# Patient Record
(demographics unavailable — no encounter records)

---

## 2024-11-27 NOTE — IMAGING
[de-identified] : Left ankle/foot:   Inspection: Swelling noted  Palpation: TTP lateral and medial malleolus  Knee and Ankle Range of Motion: Full with pain Strength: 5/5 Neurovascular intact Gait: antalgic  Right Ankle/Foot  Inspection: No swelling Palpation: Non tender to palpation Knee and Ankle Range of Motion: Full without pain Strength: 5/5  Neurovascular intact Gait: antalgic

## 2024-11-27 NOTE — HISTORY OF PRESENT ILLNESS
[de-identified] : 11/27/24: pt is a 63 y/o male with mey foot pain. pt states that he fell down his ladder and his feet got tangled up in the rungs. Pt also notes pain underneath his left shoulder blade and tailbone. Injury occurred this morning. pt took tylenol prior to coming and has been applying ice. pt notes hx of tendonitis in his rt foot. pt also notes hx of left ankle fx. PMH: None [de-identified] : none

## 2024-11-27 NOTE — ASSESSMENT
[FreeTextEntry1] : 63 y/o F with L ankle sprain, XR negative for fx b/l  CAM boot, WBAT  ice/rest/elevation meloxicam prescribed for pain/swelling Follow up with foot and ankle specialist 1-2 weeks

## 2024-12-10 NOTE — DISCUSSION/SUMMARY
[de-identified] : New dressing applied with bacitracin. May d/c cam boot icing/elevation home exercises and stretching Can continue Meloxicam 15mg as needed f/u 6 weeks or as needed

## 2024-12-10 NOTE — IMAGING
[Left] : left foot [Weight -] : weightbearing [There are no fractures, subluxations or dislocations. No significant abnormalities are seen] : There are no fractures, subluxations or dislocations. No significant abnormalities are seen [FreeTextEntry9] : images reviewed from 11/27/24 [de-identified] : images reviewed from 11/27/24

## 2024-12-10 NOTE — HISTORY OF PRESENT ILLNESS
[] : yes [de-identified] : 12/10/2024: SIRISHA MCCARTHY is a 62 year old male here today for LEFT foot evaluation. pt states his foot was tangled in ladder causing him to fall on 11/27/24 and he sustained a lateral ankle abrasion. Seen at [facility] urgent care +xrays and cam boot. present today WB in cam boot. He has been taking Meloxicam 15mg as needed with some relief.. -h/o LEFT ankle fx ~40 years ago, treated non-operatively.  [de-identified] : xrays

## 2024-12-10 NOTE — PHYSICAL EXAM
[Moderate] : moderate swelling of lateral ankle [2+] : dorsalis pedis pulse: 2+ [] : no calf tenderness [FreeTextEntry3] : 4x.5cm oblique abrasion to the lateral ankle that is healing. No discharge or signs of infection.  [FreeTextEntry9] : not assessed [de-identified] : not assessed

## 2025-01-21 NOTE — PHYSICAL EXAM
[2+] : dorsalis pedis pulse: 2+ [] : no lateral ankle joint line tenderness [FreeTextEntry3] : abrasion healing [FreeTextEntry9] : not assessed [de-identified] : not assessed

## 2025-01-21 NOTE — HISTORY OF PRESENT ILLNESS
[4] : 4 [0] : 0 [Dull/Aching] : dull/aching [Tightness] : tightness [Intermittent] : intermittent [Sleep] : sleep [Physical therapy] : physical therapy [de-identified] : 12/10/2024: SIRISHA MCCARTHY is a 62 year old male here today for LEFT foot evaluation. pt states his foot was tangled in ladder causing him to fall on 11/27/24 and he sustained a lateral ankle abrasion. Seen at [facility] urgent care +xrays and cam boot. present today WB in cam boot. He has been taking Meloxicam 15mg as needed with some relief.. -h/o LEFT ankle fx ~40 years ago, treated non-operatively.   01/21/2025:  PT is here to F/U with L foot pain. He states that improving. HEP. Notes swelling in the toes upon waking. Notes soreness. ROM impacted; unable to fully rotate ankle w/o pain. WB in sneakers. Walks approx 2 miles, unsure if that is impacting his pain   [] : no [FreeTextEntry1] : L foot [de-identified] : xrays